# Patient Record
Sex: MALE | Race: WHITE | NOT HISPANIC OR LATINO
[De-identification: names, ages, dates, MRNs, and addresses within clinical notes are randomized per-mention and may not be internally consistent; named-entity substitution may affect disease eponyms.]

---

## 2021-07-26 ENCOUNTER — APPOINTMENT (OUTPATIENT)
Dept: PULMONOLOGY | Facility: CLINIC | Age: 56
End: 2021-07-26
Payer: COMMERCIAL

## 2021-07-26 VITALS
HEIGHT: 66 IN | HEART RATE: 68 BPM | BODY MASS INDEX: 36.96 KG/M2 | SYSTOLIC BLOOD PRESSURE: 140 MMHG | DIASTOLIC BLOOD PRESSURE: 90 MMHG | WEIGHT: 230 LBS

## 2021-07-26 DIAGNOSIS — I10 ESSENTIAL (PRIMARY) HYPERTENSION: ICD-10-CM

## 2021-07-26 DIAGNOSIS — Z78.9 OTHER SPECIFIED HEALTH STATUS: ICD-10-CM

## 2021-07-26 DIAGNOSIS — E78.5 HYPERLIPIDEMIA, UNSPECIFIED: ICD-10-CM

## 2021-07-26 PROBLEM — Z00.00 ENCOUNTER FOR PREVENTIVE HEALTH EXAMINATION: Status: ACTIVE | Noted: 2021-07-26

## 2021-07-26 PROCEDURE — 99072 ADDL SUPL MATRL&STAF TM PHE: CPT

## 2021-07-26 PROCEDURE — 99214 OFFICE O/P EST MOD 30 MIN: CPT

## 2021-07-26 RX ORDER — OLMESARTAN MEDOXOMIL 40 MG/1
40 TABLET, FILM COATED ORAL
Qty: 90 | Refills: 0 | Status: ACTIVE | COMMUNITY
Start: 2020-10-13

## 2021-07-26 RX ORDER — AMLODIPINE BESYLATE 2.5 MG/1
2.5 TABLET ORAL
Qty: 90 | Refills: 0 | Status: ACTIVE | COMMUNITY
Start: 2020-10-13

## 2021-07-26 RX ORDER — HYDROCHLOROTHIAZIDE 25 MG/1
25 TABLET ORAL
Qty: 90 | Refills: 0 | Status: ACTIVE | COMMUNITY
Start: 2020-10-13

## 2021-07-26 NOTE — HISTORY OF PRESENT ILLNESS
[FreeTextEntry1] : Dr. Willy Umana\par 56 year old man  with history of daniel is here in the sleep center to address sleep apnea.  Patient is sleepy with [place] sleepiness score of 14.  Patient has very loud snoring which disturbs his wife, also has witnessed apneas.  Patient's bedtime is around 10 PM wakes up in the morning around 4 AM.  He feels rested when he wakes up.  Patient drinks few cups of coffee during the daytime. Patient does not have any headaches or nocturia.  He is sleepy while driving.\par \par Patient did a sleep study which showed severe obstructive sleep apnea with AHI of 61, patient underwent titration study which showed that he needed a BiPAP machine of 19/15 centimeters of pressure.\par \par Patient in the past underwent surgery for the sleep apnea which helped for a few years but now the symptoms have come back and the new study shows that he has severe obstructive sleep apnea.\par \par

## 2021-08-03 ENCOUNTER — TRANSCRIPTION ENCOUNTER (OUTPATIENT)
Age: 56
End: 2021-08-03

## 2022-06-16 NOTE — ASSESSMENT
Problem: Falls - Risk of  Goal: *Absence of Falls  Description: Document Homa Peoples Fall Risk and appropriate interventions in the flowsheet. Outcome: Progressing Towards Goal  Note: Fall Risk Interventions:            Medication Interventions: Teach patient to arise slowly     Patient received, appears to be asleep, eyes closed, breathing even and unlabored. No signs of distress noted. Will continue to monitor for safety.
[FreeTextEntry1] : 56-year-old man with severe obstructive sleep apnea, patient did a titration study and did well with the BiPAP 19/15 centimeters of pressure, will order BiPAP and look at the download data in few weeks time.
The procedure was performed independently

## 2024-06-17 ENCOUNTER — APPOINTMENT (OUTPATIENT)
Dept: PULMONOLOGY | Facility: CLINIC | Age: 59
End: 2024-06-17
Payer: COMMERCIAL

## 2024-06-17 VITALS
SYSTOLIC BLOOD PRESSURE: 140 MMHG | DIASTOLIC BLOOD PRESSURE: 80 MMHG | HEIGHT: 66 IN | WEIGHT: 245 LBS | BODY MASS INDEX: 39.37 KG/M2

## 2024-06-17 DIAGNOSIS — E11.9 TYPE 2 DIABETES MELLITUS W/OUT COMPLICATIONS: ICD-10-CM

## 2024-06-17 DIAGNOSIS — E66.9 OBESITY, UNSPECIFIED: ICD-10-CM

## 2024-06-17 DIAGNOSIS — G47.33 OBSTRUCTIVE SLEEP APNEA (ADULT) (PEDIATRIC): ICD-10-CM

## 2024-06-17 PROCEDURE — 99213 OFFICE O/P EST LOW 20 MIN: CPT

## 2024-06-17 RX ORDER — SIMVASTATIN 10 MG/1
10 TABLET, FILM COATED ORAL
Qty: 90 | Refills: 0 | Status: DISCONTINUED | COMMUNITY
Start: 2020-10-13 | End: 2024-06-17

## 2024-06-17 RX ORDER — ATORVASTATIN CALCIUM 80 MG/1
TABLET, FILM COATED ORAL
Refills: 0 | Status: ACTIVE | COMMUNITY

## 2024-06-17 RX ORDER — METFORMIN HYDROCHLORIDE 625 MG/1
TABLET ORAL
Refills: 0 | Status: ACTIVE | COMMUNITY

## 2024-06-17 NOTE — HISTORY OF PRESENT ILLNESS
[FreeTextEntry1] : Maureen Quintero Dr. 59 year old man  with history of daniel, htn, diabetes, hld is here in the sleep center to address sleep apnea.  Patient is sleepy with [place] sleepiness score of 12.  Patient has very loud snoring, also has witnessed apneas.  Patient's bedtime is around 10 PM wakes up in the morning around 6 AM.  He feels rested when he wakes up.  Patient drinks few cups of coffee during the daytime. Patient does not have any headaches or nocturia.  He is not sleepy while driving.  Patient did a sleep study which showed severe obstructive sleep apnea with AHI of 61, patient underwent titration study which showed that he needed a BiPAP machine of 19/15 centimeters of pressure.  patient had deviated nasal septal surgery in the past. He couldnt tolerate fullface mask, I gave him a nasal mask, asked him to use and bring the cpap. BMI 39

## 2024-06-17 NOTE — ASSESSMENT
[FreeTextEntry1] : Plan:  Patient not using cpap due to mask discomfort. Ensure proper mask fit and comfort, given previous intolerance to a full-face mask.  Reinforced the importance of consistent use of BiPAP therapy. Provide education on sleep hygiene and lifestyle modifications, including weight management.  Schedule a follow-up visit to assess tolerance to BiPAP therapy, review CPAP data, and adjust settings if necessary. Discussed the importance of weight management in the context of obstructive sleep apnea (SUZE). Highlight how excess weight, particularly around the neck, can contribute to airway obstruction during sleep  Recommend a balanced diet rich in fruits, vegetables, lean proteins, and whole grains. Encourage portion control and mindful eating to support weight loss goals.  discussed regular physical activity, aiming for at least 150 minutes of moderate-intensity aerobic exercise per week. Suggest incorporating activities that promote overall fitness and weight loss, such as walking, swimming, or cycling.  asked pt to discuss about glp 1 with his primary care for wt loss.

## 2024-09-23 ENCOUNTER — APPOINTMENT (OUTPATIENT)
Dept: PULMONOLOGY | Facility: CLINIC | Age: 59
End: 2024-09-23